# Patient Record
Sex: MALE | Race: WHITE
[De-identification: names, ages, dates, MRNs, and addresses within clinical notes are randomized per-mention and may not be internally consistent; named-entity substitution may affect disease eponyms.]

---

## 2017-12-08 ENCOUNTER — HOSPITAL ENCOUNTER (EMERGENCY)
Dept: HOSPITAL 45 - C.EDB | Age: 22
Discharge: HOME | End: 2017-12-08
Payer: COMMERCIAL

## 2017-12-08 VITALS — TEMPERATURE: 98.6 F

## 2017-12-08 VITALS
BODY MASS INDEX: 26.67 KG/M2 | HEIGHT: 70 IN | WEIGHT: 186.29 LBS | HEIGHT: 70 IN | WEIGHT: 186.29 LBS | BODY MASS INDEX: 26.67 KG/M2

## 2017-12-08 VITALS — HEART RATE: 70 BPM | DIASTOLIC BLOOD PRESSURE: 81 MMHG | OXYGEN SATURATION: 96 % | SYSTOLIC BLOOD PRESSURE: 137 MMHG

## 2017-12-08 DIAGNOSIS — F17.210: ICD-10-CM
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
## 2017-12-08 DIAGNOSIS — S39.012A: Primary | ICD-10-CM

## 2017-12-08 NOTE — EMERGENCY ROOM VISIT NOTE
History


First contact with patient:  18:57


Chief Complaint:  BACK PAIN


Stated Complaint:  LIMITED MOBILITYLOW BACK PAIN TROUBLE BREATH





History of Present Illness


The patient is a 22 year old male who presents to the Emergency Room with 

complaints of lower back pain after tripping and falling down 4 steps yesterday 

morning.  The patient reports landing on his left side, but felt a pop in the 

lower back.  The pain does not radiate into the buttocks or down the legs.  He 

denies any pain radiating into the upper back or abdomen.  Deep breathing 

slightly worsens his lower back discomfort, otherwise denies any shortness of 

breath, chest pain, neck pain, head injury or loss of consciousness.  He 

currently rates his discomfort a 9 out of 10.  He last took ibuprofen last 

evening, and has taken no medicines today for his pain.





Review of Systems


10 system review was performed and was negative except for pertinent positives 

and negatives as indicated in history of present illness





Past Medical/Surgical History





Medical Problems:


(1) No significant past medical history


Surgical Problems:


(1) No history of previous surgery





Family History


Unremarkable





Social History


Smoking Status:  Current Every Day Smoker


Alcohol Use:  occasionally


Marital Status:  single


Occupation Status:  employed





Current/Historical Medications


Scheduled PRN


Cyclobenzaprine Hcl (Flexeril), 10 MG PO TID PRN for spasm


Oxycodone Ir (Roxicodone Ir), 1-2 TAB PO Q4H PRN for Pain





Physical Exam


Vital Signs











  Date Time  Temp Pulse Resp B/P (MAP) Pulse Ox O2 Delivery O2 Flow Rate FiO2


 


12/8/17 21:42  70 18 137/81 96   


 


12/8/17 19:47  84 17 130/72 96 Room Air  


 


12/8/17 18:46 37.0 106 18 17/95 98 Room Air  











Physical Exam


CONSTITUTIONAL:  Healthy and well nourished.  Alert and oriented X 3 with 

positive affect.  Patient appears in moderate discomfort from pain.


HEENT:  Normocephalic, atraumatic.  Pupils equal, round and reactive.  No 

hemotympanum, epistaxis, subconjunctival hemorrhage, raccoon's eyes or Strickland 

sign.


NECK:  Full active range of motion without discomfort.


RESPIRATORY:  Clear to auscultation bilaterally with no wheezing, crackles, 

rhonchi or stridor.


CARDIOVASCULAR:  Regular rate and rhythm with no murmurs, rubs or gallops.


GASTROINTESTINAL:  Bowel sounds present in all quadrants.  Soft and nontender 

to palpation.


MUSCULOSKELETAL: Examination shows generalized tenderness to palpation of the 

lower central lumbar spine and paraspinous muscles.  Negative logroll.  

Negative straight leg raise.  Pelvis stable with rock.  No tenderness to 

palpation through the ribs or central thoracic spine.  Ankle plantar/

dorsiflexion strength is 5 out of 5 and symmetric bilaterally.  Pedal pulses 

are intact.


INTEGUMENTARY:  No rash or other significant dermatologic conditions noted.


NEUROLOGIC:  No focal neurologic deficits noted.  Lower extremities are sensory 

intact.





Medical Decision & Procedures


ER Provider


Diagnostic Interpretation:


My interpretation of lumbar spine x-rays does not show any acute fractures, 

subluxations or lordotic reversal.  Radiologist report is as follows:





L-SPINE MIN 4 VIEWS ROUTINE





HISTORY:  22 years-old Male LBP - fall down steps acute low back pain status


post fall





COMPARISON: None available





TECHNIQUE: 5 views of the lumbar spine





FINDINGS: 


5 lumbar type vertebral segments are present. No spondylolysis or


spondylolisthesis. No significant degenerative changes, acute fracture or


subluxation. Soft tissues are unremarkable. Moderate stool in of the ascending


colon.





IMPRESSION: No acute fracture or subluxation.





Medications Administered











 Medications


  (Trade)  Dose


 Ordered  Sig/Lea


 Route  Start Time


 Stop Time Status Last Admin


Dose Admin


 


 Morphine Sulfate


  (MoRPHine


 SULFATE INJ)  4 mg  NOW  STAT


 IM  12/8/17 19:07


 12/8/17 19:09 DC 12/8/17 19:21


4 MG


 


 Ketorolac


 Tromethamine


  (Toradol Inj)  60 mg  NOW  STAT


 IM  12/8/17 19:07


 12/8/17 19:09 DC 12/8/17 19:19


60 MG


 


 Acetaminophen


  (Tylenol Tab)  1,000 mg  NOW  STAT


 PO  12/8/17 19:07


 12/8/17 19:09 DC 12/8/17 19:16


1,000 MG


 


 Oxycodone HCl


  (Roxicodone


 Immediate Rel 5MG


 Home Pack)  1 homepack  UD  ONCE


 PO  12/8/17 21:15


 12/8/17 21:16 DC 12/8/17 21:29


1 HOMEPACK


 


 Cyclobenzaprine


 HCl


  (FLEXERIL 10MG


 Home Pack)  1 homepack  UD  ONCE


 PO  12/8/17 21:15


 12/8/17 21:16 DC 12/8/17 21:29


1 HOMEPACK


 


 Hydromorphone HCl


  (Dilaudid Inj)  0.5 mg  ONE  STAT


 IM  12/8/17 21:28


 12/8/17 21:30 DC 12/8/17 21:40


0.5 MG











ED Course


Patient history and physical exam were performed.  Nurse's notes were reviewed.

  Vital signs were reviewed and were normal.  The patient was administered IM 

morphine and Toradol for his pain.  He was also administered Tylenol 1000 mg 

orally.  X-rays of the lumbar spine were normal.  The patient reports that his 

pain was moderately relieved, but requested additional pain medicine after 

trial ambulation worsened his discomfort.  He was administered Dilaudid 0.5 mg 

IM.  The patient was provided home packs and prescriptions for Flexeril and 

OxyIR 5 mg.  He was given instructions and side effects of these medications.  

No drinking alcohol or driving while taking these medications.  He was 

encouraged to follow-up with his PCP if symptoms are not improving within the 

next week.  He was given instructions to return to the emergency department for 

symptoms of cauda equina syndrome, which were well briefed with the patient.  

The patient was happy with plan of care, voiced understanding of all discharge 

instructions, and rated his discomfort a 4 out of 10 at the conclusion of my 

exam.





Medical Decision








[place] Drug Monitoring Program


Search Results:  patient reviewed within database, no issues identified





Medication Reconcilliation


Current Medication List:  was personally reviewed by me





Blood Pressure Screening


Patient's blood pressure:  Normal blood pressure





Impression





 Primary Impression:  


 Acute lumbar myofascial strain


 Additional Impression:  


 Fall down steps





Departure Information


Dispostion


Home / Self-Care





Condition


GOOD





Prescriptions





Oxycodone Ir (Roxicodone Ir) 5 Mg Tab


1-2 TAB PO Q4H Y for Pain, #15 TAB


   For Initial Treatment


   Prov: Yuriy Palma PA         12/8/17 


Cyclobenzaprine Hcl (FLEXERIL) 10 Mg Tab


10 MG PO TID Y for spasm, #15 TAB


   Prov: Yuriy Palma PA         12/8/17





Referrals


No Doctor, Assigned (PCP)





Forms


HOME CARE DOCUMENTATION FORM,                                                 

               IMPORTANT VISIT INFORMATION





Patient Instructions


My [facility]





Additional Instructions





Alternate application of ice and heat to back.


Do not sit for long periods of time.  Avoid heavy lifting.  


Ibuprofen 800 mg and/or Tylenol 1000 mg every 8 hours.


You may also alternate these medications for more effective pain relief:


Ibuprofen --4 HRS--> Tylenol --4 HRS--> ibuprofen --4 HRS--> Tylenol ....


OxyIR as needed for worse pain.  Flexeril for muscle spasms.  


Do not drink or drive while taking OxyIR or Flexeril.  


Follow-up with your family doctor if symptoms persist.


Return to the emergency department with any bladder/bowel incontinence, 

numbness of the inner thighs or profound leg weakness.





Problem Qualifiers








 Primary Impression:  


 Acute lumbar myofascial strain


 Encounter type:  initial encounter  Qualified Codes:  S39.012A - Strain of 

muscle, fascia and tendon of lower back, initial encounter


 Additional Impression:  


 Fall down steps


 Encounter type:  initial encounter  Qualified Codes:  W10.8XXA - Fall (on) (

from) other stairs and steps, initial encounter

## 2017-12-08 NOTE — DIAGNOSTIC IMAGING REPORT
L-SPINE MIN 4 VIEWS ROUTINE



HISTORY:  22 years-old Male LBP - fall down steps acute low back pain status

post fall



COMPARISON: None available



TECHNIQUE: 5 views of the lumbar spine



FINDINGS: 

5 lumbar type vertebral segments are present. No spondylolysis or

spondylolisthesis. No significant degenerative changes, acute fracture or

subluxation. Soft tissues are unremarkable. Moderate stool in of the ascending

colon.



IMPRESSION: No acute fracture or subluxation.







The above report was generated using voice recognition software. It may contain

grammatical, syntax or spelling errors.







Electronically signed by:  Geoff Sepulvdea M.D.

12/8/2017 7:49 PM



Dictated Date/Time:  12/8/2017 7:47 PM

## 2018-01-18 ENCOUNTER — HOSPITAL ENCOUNTER (EMERGENCY)
Dept: HOSPITAL 45 - C.EDB | Age: 23
Discharge: HOME | End: 2018-01-18
Payer: COMMERCIAL

## 2018-01-18 VITALS — DIASTOLIC BLOOD PRESSURE: 76 MMHG | OXYGEN SATURATION: 9 % | HEART RATE: 88 BPM | SYSTOLIC BLOOD PRESSURE: 140 MMHG

## 2018-01-18 VITALS — TEMPERATURE: 99.32 F

## 2018-01-18 VITALS
BODY MASS INDEX: 26.85 KG/M2 | HEIGHT: 70.98 IN | WEIGHT: 191.8 LBS | BODY MASS INDEX: 26.85 KG/M2 | HEIGHT: 70.98 IN | WEIGHT: 191.8 LBS

## 2018-01-18 DIAGNOSIS — M62.838: Primary | ICD-10-CM

## 2018-01-18 NOTE — DIAGNOSTIC IMAGING REPORT
CERVICAL SPINE W/O





CT DOSE: 508.86 mGycm



HISTORY: Trauma. Pain.  L sided neck pain with popping 



TECHNIQUE: Multiaxial CT images of the cervical spine were performed and

reformatted in the sagittal and coronal plane without the use of contrast.  A

dose lowering technique was utilized adhering to the principles of ALARA.



COMPARISON:  None.



FINDINGS: No fractures. No subluxation. Prevertebral soft tissues and the C1-C2

interval are intact. No pneumothorax.



IMPRESSION:  

No fractures within the cervical spine.







The above report was generated using voice recognition software.  It may contain

grammatical, syntax or spelling errors.







Electronically signed by:  Keon Martinez M.D.

1/18/2018 12:45 PM



Dictated Date/Time:  1/18/2018 12:44 PM

## 2018-01-19 NOTE — EMERGENCY ROOM VISIT NOTE
ED Visit Note


First contact with patient:  11:17


Chief Complaint: Neck pain.





History of Present Illness: Mr. Singh is a 23-year-old white male who 

ambulates into the ED accompanied by a female complaining of left-sided neck 

pain.


Historically patient denies any previous significant neck injuries or surgeries.


Patient reports a proximally 2 hours ago he was working under his car.  He 

reports he dropped it cool and flinched his neck and avoid the tool and felt a 

popping sensation in the left side of the neck.  He goes on to report a few 

minutes after that he was once again working with a tool under the car and 

twisted his neck to  a tool and then once again felt a popping sensation.


He reports since the first popping sensation he has been having sharp pain over 

the left side of the neck in the area of the C5 through C6 trapezius.  His pain 

has been constant.  He rates his discomfort 9/10.  The pain is radiating down 

the neck.  His pain worsens with all movements of the neck and palpation.  He 

has not identified any alleviating factors related to the pain.  He has not 

taken any medications for pain prior to arrival at the hospital.  He denies any 

associated symptoms including headache, dizziness, lightheadedness, recent 

upper respiratory tract symptoms, fevers, chills, sweats, skin eruptions, upper 

extremity weakness/numbness/tingling, nausea/vomiting.  





Review of Systems: As noted above in history of present illness. 8 body systems 

were reviewed and found to be negative as noted above.





Past Medical History: Asthma.


Current Medications: Patient denies.


Allergies to Medications: Patient denies.


Social History: Patient is currently employed; he feels safe in his home 

environment; he denies tobacco and alcohol use.





Physical Examination:


Vital Signs: 








  Date Time  Temp Pulse Resp B/P (MAP) Pulse Ox O2 Delivery O2 Flow Rate FiO2


 


1/18/18 12:55  88 20 140/76 9 Room Air  


 


1/18/18 10:56 37.4 99 18 153/89 98 Room Air  





GENERAL: 23-year-old male in moderate distress due to pain, nontoxic-appearing, 

afebrile and hemodynamically stable.


NEUROLOGICAL: Awake, alert and oriented to person, place and time.  Answering 

questions appropriately and following commands.  Normal gait.  Good hand eye 

coordination.  No focal motor sensory deficits.


SKIN: Warm, dry and pink.  No soft tissue eruptions or trauma noted.


HEENT:  Atraumatic and normocephalic.  


BACK:  No tenderness over the bony cervical and thoracic spine.  Odd or 

tenderness throughout the left trapezius with obvious spasm.  Dramatic decrease 

in range of motion at the cervical spine due to pain.    


THORAX:  Lungs sounds are clear to auscultation and equal bilaterally with 

symmetrical chest wall.  


UPPER EXTREMITIES: No gross bony deformities.  No tenderness over the 

glenohumeral joint, chromium clavicular joint or the scapula.  Moderate 

tenderness as previously noted in the left trapezius muscle with spasm.  5/5 

muscle strength in flexion, extension, abduction and abduction and internal and 

external rotation of the shoulders, flexion and extension of the elbows, 

pronation and supination the forearms.  Bicipital, tricipital and brachial 

radial deep tendon reflexes intact and equal bilaterally.  Throughout the hand 

the skin was warm and pink and capillary refill is brisk.





ED Course:


Patient is assessed as noted above.


Patient's medication list was reviewed.


CT Cervical Spine: Was reviewed by myself and read by the radiologist showing 

no acute fractures or subluxations of the cervical spine.


Patient was given one Percocet 5/325 mg tablet by mouth for pain.


Patient was placed in a soft cervical collar.


Patient is educated about today's findings and instructed on his treatment plan

; he verbalized understanding and agreement with this plan.





Clinical Impression: Left trapezius muscle spasm.





Disposition: Patient discharged home in stable condition accompanied by his 

girlfriend; prior to departure he was reassessed and subjectively reported he 

was feeling worse and rated his discomfort 10/10.





Plan:


Patient was encouraged to alternate ibuprofen and acetaminophen and ibuprofen 

every 3 hours, hot and cold therapy, use a cervical collar and a prescription 

for Flexeril.


Patient is encouraged to follow-up with PCP if no better in 6 days.


Patient is encouraged return ED for worsening/uncontrolled pain, upper 

extremity weakness/numbness/tingling, fevers or any new/concerning symptoms.